# Patient Record
Sex: MALE | Race: ASIAN | NOT HISPANIC OR LATINO | URBAN - METROPOLITAN AREA
[De-identification: names, ages, dates, MRNs, and addresses within clinical notes are randomized per-mention and may not be internally consistent; named-entity substitution may affect disease eponyms.]

---

## 2024-05-21 ENCOUNTER — EMERGENCY (EMERGENCY)
Facility: HOSPITAL | Age: 59
LOS: 1 days | Discharge: HOME | End: 2024-05-21
Attending: STUDENT IN AN ORGANIZED HEALTH CARE EDUCATION/TRAINING PROGRAM | Admitting: STUDENT IN AN ORGANIZED HEALTH CARE EDUCATION/TRAINING PROGRAM
Payer: SELF-PAY

## 2024-05-21 VITALS
OXYGEN SATURATION: 100 % | RESPIRATION RATE: 16 BRPM | WEIGHT: 188.05 LBS | DIASTOLIC BLOOD PRESSURE: 90 MMHG | HEIGHT: 66 IN | TEMPERATURE: 98 F | HEART RATE: 89 BPM | SYSTOLIC BLOOD PRESSURE: 136 MMHG

## 2024-05-21 VITALS
OXYGEN SATURATION: 100 % | DIASTOLIC BLOOD PRESSURE: 99 MMHG | HEART RATE: 80 BPM | RESPIRATION RATE: 18 BRPM | TEMPERATURE: 98 F | SYSTOLIC BLOOD PRESSURE: 145 MMHG

## 2024-05-21 PROCEDURE — 99053 MED SERV 10PM-8AM 24 HR FAC: CPT

## 2024-05-21 PROCEDURE — 99283 EMERGENCY DEPT VISIT LOW MDM: CPT

## 2024-05-21 RX ORDER — LORATADINE 10 MG/1
10 TABLET ORAL ONCE
Refills: 0 | Status: COMPLETED | OUTPATIENT
Start: 2024-05-21 | End: 2024-05-21

## 2024-05-21 RX ORDER — FAMOTIDINE 10 MG/ML
20 INJECTION INTRAVENOUS ONCE
Refills: 0 | Status: COMPLETED | OUTPATIENT
Start: 2024-05-21 | End: 2024-05-21

## 2024-05-21 RX ADMIN — FAMOTIDINE 20 MILLIGRAM(S): 10 INJECTION INTRAVENOUS at 08:21

## 2024-05-21 RX ADMIN — LORATADINE 10 MILLIGRAM(S): 10 TABLET ORAL at 08:21

## 2024-05-21 RX ADMIN — Medication 50 MILLIGRAM(S): at 08:21

## 2024-05-21 NOTE — ED ADULT NURSE NOTE - NSICDXPASTSURGICALHX_GEN_ALL_CORE_FT
Father endorses that he himself was hospitalized at 18y/o for an "emotional breakdown" and was diagnosed with schizophrenia. He was in [facility] for 4 months, and after that followed up for about 1 year with psychiatry. He decided on his own to stop his meds cold turkey and "fought my way out of it."
PAST SURGICAL HISTORY:  No significant past surgical history

## 2024-05-21 NOTE — ED ADULT TRIAGE NOTE - NS ED TRIAGE AVPU SCALE
The patient is a 78y Male complaining of Alert-The patient is alert, awake and responds to voice. The patient is oriented to time, place, and person. The triage nurse is able to obtain subjective information.

## 2024-05-21 NOTE — ED PROVIDER NOTE - SKIN, MLM
Skin normal color for race, warm, dry and intact. Scattered urticaria on bilateral upper arms, back, chest

## 2024-05-21 NOTE — ED ADULT TRIAGE NOTE - CHIEF COMPLAINT QUOTE
c/o hives x 2 weeks to B/L arms, legs, abdomen & back. denies allergies to food, medication, new detergents, fragrances. Hx HTN, HLD

## 2024-05-21 NOTE — ED ADULT NURSE NOTE - OBJECTIVE STATEMENT
Pt received to intake. Pt is AxO 3 and ambulatory. pt c/o hives on his arms, lower back and inner thighs x 2 weeks. pt  denies any known allergies. pt endorses recently arriving to the [place]. pt endorse taking benadryl and hour before arriving with "some" relief of pruritic hives. Pt endorses past medical hx of HTN, and HLD. Pt respirations even and unlabored. Airway remains clear and patent, able to clear secretions. pt medicated as prescribed. plan of care ongoing.

## 2024-05-21 NOTE — ED PROVIDER NOTE - CLINICAL SUMMARY MEDICAL DECISION MAKING FREE TEXT BOX
59 yo M with h/o HTN who presents to the ED c/o 2 weeks of hives on his arms, chest, back, and legs. Symptoms relieved by benadryl but then return after a few hours. No known recent exposures. Clinical impression c/w urticaria. Plan for symptomatic treatment

## 2024-05-21 NOTE — ED PROVIDER NOTE - PATIENT PORTAL LINK FT
You can access the FollowMyHealth Patient Portal offered by [facility] by registering at the following website: [email or web address]. By joining [employer]’s FollowMyHealth portal, you will also be able to view your health information using other applications (apps) compatible with our system.

## 2024-05-21 NOTE — ED PROVIDER NOTE - NSFOLLOWUPINSTRUCTIONS_ED_ALL_ED_FT
You were seen in the emergency department for urticaria.     You can take 10 mg claritin once a day and famotidine 20 mg twice a day for the symptoms. A prescription for prednisone was sent to the pharmacy for you to take as well.     Return to the emergency department for any new or worsening symptoms including difficulty breathing, lip swelling, vomiting.

## 2024-05-21 NOTE — ED ADULT NURSE NOTE - NSFALLUNIVINTERV_ED_ALL_ED
Bed/Stretcher in lowest position, wheels locked, appropriate side rails in place/Call bell, personal items and telephone in reach/Instruct patient to call for assistance before getting out of bed/chair/stretcher/Non-slip footwear applied when patient is off stretcher/[place] to call system/Physically safe environment - no spills, clutter or unnecessary equipment/Purposeful proactive rounding/Room/bathroom lighting operational, light cord in reach

## 2024-05-21 NOTE — ED PROVIDER NOTE - OBJECTIVE STATEMENT
57 yo M with h/o HTN who presents to the ED c/o 2 weeks of hives. He reports that they are on his BUE, chest, back, and legs. Has been taking benadryl which will relieve for the symptoms for a few hours but then they will return. Reports associated itching. No known recent food, lotion, soap, detergent but is also visiting from [facility]. No other complaints or symptoms.

## 2024-06-19 PROBLEM — Z00.00 ENCOUNTER FOR PREVENTIVE HEALTH EXAMINATION: Status: ACTIVE | Noted: 2024-06-19

## 2024-06-19 PROBLEM — I10 ESSENTIAL (PRIMARY) HYPERTENSION: Chronic | Status: ACTIVE | Noted: 2024-05-21

## 2024-06-21 ENCOUNTER — APPOINTMENT (OUTPATIENT)
Dept: RADIOLOGY | Facility: CLINIC | Age: 59
End: 2024-06-21
Payer: SELF-PAY

## 2024-06-21 PROCEDURE — 71046 X-RAY EXAM CHEST 2 VIEWS: CPT
